# Patient Record
Sex: FEMALE | Race: WHITE | NOT HISPANIC OR LATINO | Employment: FULL TIME | URBAN - METROPOLITAN AREA
[De-identification: names, ages, dates, MRNs, and addresses within clinical notes are randomized per-mention and may not be internally consistent; named-entity substitution may affect disease eponyms.]

---

## 2023-03-07 ENCOUNTER — TELEPHONE (OUTPATIENT)
Dept: PRIMARY CARE | Facility: CLINIC | Age: 38
End: 2023-03-07
Payer: COMMERCIAL

## 2023-03-07 DIAGNOSIS — I10 HYPERTENSION, BENIGN: ICD-10-CM

## 2023-03-07 DIAGNOSIS — F41.9 ANXIETY: ICD-10-CM

## 2023-03-07 DIAGNOSIS — F90.9 ATTENTION DEFICIT HYPERACTIVITY DISORDER (ADHD), UNSPECIFIED ADHD TYPE: ICD-10-CM

## 2023-03-07 NOTE — TELEPHONE ENCOUNTER
Patient request for Sertraline 50mg needs urgent because she gets side effects if not taking medication. Propranololo 10mg and Anphetanine-Dextroanpheta 20mg - 2x daily.

## 2023-03-08 PROBLEM — F41.9 ANXIETY: Status: ACTIVE | Noted: 2023-03-08

## 2023-03-08 PROBLEM — F90.9 ADHD: Status: ACTIVE | Noted: 2023-03-08

## 2023-03-08 PROBLEM — I10 HYPERTENSION, BENIGN: Status: ACTIVE | Noted: 2023-03-08

## 2023-03-08 RX ORDER — SERTRALINE HYDROCHLORIDE 50 MG/1
50 TABLET, FILM COATED ORAL DAILY
Qty: 90 TABLET | Refills: 1 | OUTPATIENT
Start: 2023-03-08

## 2023-03-08 RX ORDER — PROPRANOLOL HYDROCHLORIDE 20 MG/1
1 TABLET ORAL 2 TIMES DAILY
COMMUNITY
Start: 2022-05-02

## 2023-03-08 RX ORDER — DEXTROAMPHETAMINE SACCHARATE, AMPHETAMINE ASPARTATE, DEXTROAMPHETAMINE SULFATE AND AMPHETAMINE SULFATE 5; 5; 5; 5 MG/1; MG/1; MG/1; MG/1
20 TABLET ORAL 2 TIMES DAILY
Qty: 60 TABLET | Refills: 0 | OUTPATIENT
Start: 2023-03-08

## 2023-03-08 RX ORDER — DEXTROAMPHETAMINE SACCHARATE, AMPHETAMINE ASPARTATE, DEXTROAMPHETAMINE SULFATE AND AMPHETAMINE SULFATE 5; 5; 5; 5 MG/1; MG/1; MG/1; MG/1
20 TABLET ORAL 2 TIMES DAILY
COMMUNITY

## 2023-03-08 RX ORDER — PROPRANOLOL HYDROCHLORIDE 20 MG/1
20 TABLET ORAL 2 TIMES DAILY
Qty: 30 TABLET | Refills: 2 | OUTPATIENT
Start: 2023-03-08

## 2023-03-08 RX ORDER — SERTRALINE HYDROCHLORIDE 50 MG/1
1 TABLET, FILM COATED ORAL DAILY
COMMUNITY
Start: 2023-01-24 | End: 2023-03-09 | Stop reason: SDUPTHER

## 2023-03-09 RX ORDER — SERTRALINE HYDROCHLORIDE 50 MG/1
50 TABLET, FILM COATED ORAL DAILY
Qty: 90 TABLET | Refills: 1 | Status: SHIPPED | OUTPATIENT
Start: 2023-03-09

## 2023-03-09 NOTE — TELEPHONE ENCOUNTER
Pt was unable to get the refill in February because it was sent in as 30day and insurance will not cover anything but 90 day supply. System shows the request was sent to Dr macedo for the refill yesterday but called the pharmacy this morning and they never received anything except the wrong 30day one from February. Pt did have a follow up appointment for the medication with Dr Macedo on 1/31 this year. Pt has been without for almost a week, she and  have been calling several times a day all week to try to get this filled.

## 2023-03-09 NOTE — TELEPHONE ENCOUNTER
Rx Refill Request Telephone Encounter    Name:  Tori Adamson  :  [id number]  Medication Name:  sertraline 50mg    Specific Pharmacy location:  per chart  Date of last appointment:  2023  Date of next appointment:  Unknown  Best number to reach patient:  per chart.

## 2023-03-15 ENCOUNTER — TELEPHONE (OUTPATIENT)
Dept: PRIMARY CARE | Facility: CLINIC | Age: 38
End: 2023-03-15
Payer: COMMERCIAL

## 2023-09-12 PROBLEM — R17 ELEVATED BILIRUBIN: Status: ACTIVE | Noted: 2023-09-12

## 2023-09-12 PROBLEM — B08.3 FIFTH DISEASE: Status: ACTIVE | Noted: 2023-09-12

## 2023-09-12 PROBLEM — E55.9 VITAMIN D DEFICIENCY: Status: ACTIVE | Noted: 2023-09-12

## 2023-09-12 PROBLEM — E66.3 OVERWEIGHT WITH BODY MASS INDEX (BMI) 25.0-29.9: Status: ACTIVE | Noted: 2023-09-12

## 2023-09-12 PROBLEM — R82.90 ABNORMAL URINE FINDINGS: Status: ACTIVE | Noted: 2023-09-12

## 2023-09-12 PROBLEM — N80.9 ENDOMETRIOSIS: Status: ACTIVE | Noted: 2023-09-12

## 2023-09-12 PROBLEM — R00.0 TACHYCARDIA: Status: ACTIVE | Noted: 2023-09-12

## 2023-09-12 PROBLEM — O16.3 ELEVATED BLOOD PRESSURE AFFECTING PREGNANCY IN THIRD TRIMESTER, ANTEPARTUM (HHS-HCC): Status: ACTIVE | Noted: 2023-09-12

## 2023-09-12 PROBLEM — R79.89 LOW SERUM SODIUM: Status: ACTIVE | Noted: 2023-09-12

## 2023-09-12 PROBLEM — O13.9 GESTATIONAL HYPERTENSION (HHS-HCC): Status: ACTIVE | Noted: 2023-09-12

## 2023-09-12 PROBLEM — G47.00 INSOMNIA: Status: ACTIVE | Noted: 2023-09-12

## 2023-09-12 PROBLEM — N76.2 VULVITIS: Status: ACTIVE | Noted: 2023-09-12

## 2023-09-12 PROBLEM — J45.909 ASTHMA (HHS-HCC): Status: ACTIVE | Noted: 2023-09-12

## 2023-09-12 RX ORDER — IBUPROFEN 600 MG/1
600 TABLET ORAL EVERY 6 HOURS
COMMUNITY
Start: 2019-11-04

## 2023-09-12 RX ORDER — LISINOPRIL AND HYDROCHLOROTHIAZIDE 12.5; 2 MG/1; MG/1
1 TABLET ORAL DAILY
COMMUNITY
Start: 2020-12-29

## 2023-09-12 RX ORDER — PROPRANOLOL HYDROCHLORIDE 10 MG/1
TABLET ORAL
COMMUNITY

## 2023-09-12 RX ORDER — HYDROXYZINE HYDROCHLORIDE 25 MG/1
25 TABLET, FILM COATED ORAL AS NEEDED
COMMUNITY
Start: 2022-09-07

## 2023-09-12 RX ORDER — ALBUTEROL SULFATE 90 UG/1
AEROSOL, METERED RESPIRATORY (INHALATION)
COMMUNITY
Start: 2022-12-30

## 2023-09-12 RX ORDER — ACETAMINOPHEN 325 MG/1
3 TABLET ORAL EVERY 6 HOURS
COMMUNITY
Start: 2019-11-04